# Patient Record
Sex: MALE | Race: WHITE | NOT HISPANIC OR LATINO | Employment: OTHER | URBAN - METROPOLITAN AREA
[De-identification: names, ages, dates, MRNs, and addresses within clinical notes are randomized per-mention and may not be internally consistent; named-entity substitution may affect disease eponyms.]

---

## 2019-05-01 LAB — HBA1C MFR BLD HPLC: 6.1 %

## 2019-05-30 ENCOUNTER — OFFICE VISIT (OUTPATIENT)
Dept: GASTROENTEROLOGY | Facility: CLINIC | Age: 84
End: 2019-05-30
Payer: COMMERCIAL

## 2019-05-30 VITALS
HEIGHT: 66 IN | DIASTOLIC BLOOD PRESSURE: 84 MMHG | HEART RATE: 84 BPM | BODY MASS INDEX: 27.64 KG/M2 | WEIGHT: 172 LBS | SYSTOLIC BLOOD PRESSURE: 138 MMHG

## 2019-05-30 DIAGNOSIS — Z79.01 CHRONIC ANTICOAGULATION: ICD-10-CM

## 2019-05-30 DIAGNOSIS — R09.89 THROAT CLEARING: Primary | ICD-10-CM

## 2019-05-30 DIAGNOSIS — K21.9 GASTROESOPHAGEAL REFLUX DISEASE, ESOPHAGITIS PRESENCE NOT SPECIFIED: ICD-10-CM

## 2019-05-30 DIAGNOSIS — Z12.11 COLON CANCER SCREENING: ICD-10-CM

## 2019-05-30 DIAGNOSIS — R05.9 COUGH: ICD-10-CM

## 2019-05-30 DIAGNOSIS — K22.70 BARRETT'S ESOPHAGUS WITHOUT DYSPLASIA: ICD-10-CM

## 2019-05-30 PROCEDURE — 99204 OFFICE O/P NEW MOD 45 MIN: CPT | Performed by: NURSE PRACTITIONER

## 2019-05-30 RX ORDER — METOPROLOL TARTRATE 50 MG/1
50 TABLET, FILM COATED ORAL EVERY 12 HOURS SCHEDULED
COMMUNITY

## 2019-05-30 RX ORDER — WARFARIN SODIUM 3 MG/1
3 TABLET ORAL
COMMUNITY
Start: 2019-04-17

## 2019-05-30 RX ORDER — ASPIRIN 81 MG/1
81 TABLET ORAL DAILY
COMMUNITY

## 2019-05-30 RX ORDER — MULTIVITAMIN
1 TABLET ORAL DAILY
COMMUNITY

## 2019-05-30 RX ORDER — OMEPRAZOLE 20 MG/1
20 CAPSULE, DELAYED RELEASE ORAL DAILY
Qty: 30 CAPSULE | Refills: 12 | Status: SHIPPED | OUTPATIENT
Start: 2019-05-30 | End: 2019-09-09 | Stop reason: DRUGHIGH

## 2019-05-30 RX ORDER — LISINOPRIL 20 MG/1
20 TABLET ORAL 2 TIMES DAILY
COMMUNITY

## 2019-05-30 RX ORDER — ATORVASTATIN CALCIUM 10 MG/1
5 TABLET, FILM COATED ORAL DAILY
COMMUNITY

## 2019-05-30 RX ORDER — TRIAMCINOLONE ACETONIDE 5 MG/G
CREAM TOPICAL DAILY
COMMUNITY

## 2019-06-03 ENCOUNTER — TELEPHONE (OUTPATIENT)
Dept: GASTROENTEROLOGY | Facility: CLINIC | Age: 84
End: 2019-06-03

## 2019-06-25 ENCOUNTER — TELEPHONE (OUTPATIENT)
Dept: GASTROENTEROLOGY | Facility: CLINIC | Age: 84
End: 2019-06-25

## 2019-09-09 ENCOUNTER — OFFICE VISIT (OUTPATIENT)
Dept: GASTROENTEROLOGY | Facility: CLINIC | Age: 84
End: 2019-09-09
Payer: COMMERCIAL

## 2019-09-09 VITALS
WEIGHT: 178 LBS | HEART RATE: 64 BPM | DIASTOLIC BLOOD PRESSURE: 72 MMHG | HEIGHT: 66 IN | BODY MASS INDEX: 28.61 KG/M2 | SYSTOLIC BLOOD PRESSURE: 132 MMHG

## 2019-09-09 DIAGNOSIS — K22.70 BARRETT'S ESOPHAGUS WITHOUT DYSPLASIA: ICD-10-CM

## 2019-09-09 DIAGNOSIS — Z12.11 COLON CANCER SCREENING: ICD-10-CM

## 2019-09-09 DIAGNOSIS — K21.9 GASTROESOPHAGEAL REFLUX DISEASE, ESOPHAGITIS PRESENCE NOT SPECIFIED: ICD-10-CM

## 2019-09-09 DIAGNOSIS — R05.9 COUGH: ICD-10-CM

## 2019-09-09 DIAGNOSIS — R09.89 THROAT CLEARING: Primary | ICD-10-CM

## 2019-09-09 DIAGNOSIS — Z79.01 CHRONIC ANTICOAGULATION: ICD-10-CM

## 2019-09-09 PROCEDURE — 99213 OFFICE O/P EST LOW 20 MIN: CPT | Performed by: NURSE PRACTITIONER

## 2019-09-09 RX ORDER — OMEPRAZOLE 40 MG/1
40 CAPSULE, DELAYED RELEASE ORAL DAILY
Qty: 30 CAPSULE | Refills: 2 | Status: SHIPPED | OUTPATIENT
Start: 2019-09-09 | End: 2019-11-29 | Stop reason: SDUPTHER

## 2019-09-09 NOTE — PATIENT INSTRUCTIONS
Continue to elevate the head of year bed at night  Eats smaller more frequent meals and avoid late-night eating (within 2-3 hours of bed)  Continue to minimize chocolate, caffeine, fatty/fried foods, alcohol, NSAIDs (ibuprofen/Advil/Motrin, Aleve/naproxen, full-dose aspirin)  Take the omeprazole in the morning 20-30 minutes before you eat or drink  You will have an upper scope here at our endoscopy center  We will contact Dr Moi Valentin office about stopping your warfarin before the procedure    Someone will be in touch with you with further instructions before the procedure

## 2019-09-11 ENCOUNTER — TELEPHONE (OUTPATIENT)
Dept: GASTROENTEROLOGY | Facility: CLINIC | Age: 84
End: 2019-09-11

## 2019-10-09 ENCOUNTER — TELEPHONE (OUTPATIENT)
Dept: GASTROENTEROLOGY | Facility: CLINIC | Age: 84
End: 2019-10-09

## 2019-10-09 NOTE — TELEPHONE ENCOUNTER
Called patient and left vm - positive for Akers's but no dyplasia - no recall advised secondary to age  Favorable biopsies -let pcp know

## 2019-11-29 DIAGNOSIS — R09.89 THROAT CLEARING: ICD-10-CM

## 2019-11-29 DIAGNOSIS — K21.9 GASTROESOPHAGEAL REFLUX DISEASE, ESOPHAGITIS PRESENCE NOT SPECIFIED: ICD-10-CM

## 2019-11-29 DIAGNOSIS — R05.9 COUGH: ICD-10-CM

## 2019-11-29 RX ORDER — OMEPRAZOLE 40 MG/1
CAPSULE, DELAYED RELEASE ORAL
Qty: 30 CAPSULE | Refills: 2 | Status: SHIPPED | OUTPATIENT
Start: 2019-11-29 | End: 2020-01-07 | Stop reason: SDUPTHER

## 2020-01-06 DIAGNOSIS — R09.89 THROAT CLEARING: ICD-10-CM

## 2020-01-06 DIAGNOSIS — R05.9 COUGH: ICD-10-CM

## 2020-01-06 DIAGNOSIS — K21.9 GASTROESOPHAGEAL REFLUX DISEASE, ESOPHAGITIS PRESENCE NOT SPECIFIED: ICD-10-CM

## 2020-01-06 NOTE — TELEPHONE ENCOUNTER
Pt left [facility] asking for  [phone]; asks if Rita Pietro is still here?/if so needs her to refill his medicine

## 2020-01-07 DIAGNOSIS — R09.89 THROAT CLEARING: ICD-10-CM

## 2020-01-07 DIAGNOSIS — R05.9 COUGH: ICD-10-CM

## 2020-01-07 DIAGNOSIS — K21.9 GASTROESOPHAGEAL REFLUX DISEASE, ESOPHAGITIS PRESENCE NOT SPECIFIED: ICD-10-CM

## 2020-01-07 RX ORDER — OMEPRAZOLE 40 MG/1
40 CAPSULE, DELAYED RELEASE ORAL DAILY
Qty: 30 CAPSULE | Refills: 2 | Status: SHIPPED | OUTPATIENT
Start: 2020-01-07 | End: 2021-01-25 | Stop reason: SDUPTHER

## 2020-01-07 RX ORDER — OMEPRAZOLE 40 MG/1
40 CAPSULE, DELAYED RELEASE ORAL DAILY
Qty: 30 CAPSULE | Refills: 2 | Status: SHIPPED | OUTPATIENT
Start: 2020-01-07 | End: 2020-05-26 | Stop reason: SDUPTHER

## 2020-01-07 NOTE — TELEPHONE ENCOUNTER
Pt walked in, asking for refill of omeprazole 40 mg 1 capsule daily sent to [facility]  Pt last o/v was 09/09/19 with [facility]   Pt cb [phone]     ce

## 2020-01-10 ENCOUNTER — TELEPHONE (OUTPATIENT)
Dept: GASTROENTEROLOGY | Facility: CLINIC | Age: 85
End: 2020-01-10

## 2020-01-10 NOTE — TELEPHONE ENCOUNTER
Pt call transf'd to spk w/ a nurse  Lengthy call/Pt very vague/hard of hearing  I think he had an EMG/needs carpal tunnel proc  Provided ph# for Dr Christia Mcburney

## 2020-05-26 DIAGNOSIS — R05.9 COUGH: ICD-10-CM

## 2020-05-26 DIAGNOSIS — K21.9 GASTROESOPHAGEAL REFLUX DISEASE, ESOPHAGITIS PRESENCE NOT SPECIFIED: ICD-10-CM

## 2020-05-26 DIAGNOSIS — R09.89 THROAT CLEARING: ICD-10-CM

## 2020-05-26 RX ORDER — OMEPRAZOLE 40 MG/1
40 CAPSULE, DELAYED RELEASE ORAL DAILY
Qty: 30 CAPSULE | Refills: 2 | Status: SHIPPED | OUTPATIENT
Start: 2020-05-26 | End: 2020-12-10

## 2020-12-10 DIAGNOSIS — R05.9 COUGH: ICD-10-CM

## 2020-12-10 DIAGNOSIS — K21.9 GASTROESOPHAGEAL REFLUX DISEASE: ICD-10-CM

## 2020-12-10 DIAGNOSIS — R09.89 THROAT CLEARING: ICD-10-CM

## 2020-12-10 RX ORDER — OMEPRAZOLE 40 MG/1
CAPSULE, DELAYED RELEASE ORAL
Qty: 30 CAPSULE | Refills: 2 | Status: SHIPPED | OUTPATIENT
Start: 2020-12-10 | End: 2021-01-25 | Stop reason: SDUPTHER

## 2021-01-25 ENCOUNTER — OFFICE VISIT (OUTPATIENT)
Dept: GASTROENTEROLOGY | Facility: CLINIC | Age: 86
End: 2021-01-25
Payer: COMMERCIAL

## 2021-01-25 VITALS
BODY MASS INDEX: 27.48 KG/M2 | HEIGHT: 66 IN | HEART RATE: 71 BPM | SYSTOLIC BLOOD PRESSURE: 120 MMHG | WEIGHT: 171 LBS | DIASTOLIC BLOOD PRESSURE: 72 MMHG

## 2021-01-25 DIAGNOSIS — Z79.01 CHRONIC ANTICOAGULATION: ICD-10-CM

## 2021-01-25 DIAGNOSIS — K21.9 GASTROESOPHAGEAL REFLUX DISEASE WITHOUT ESOPHAGITIS: Primary | ICD-10-CM

## 2021-01-25 DIAGNOSIS — K22.70 BARRETT'S ESOPHAGUS WITHOUT DYSPLASIA: ICD-10-CM

## 2021-01-25 PROCEDURE — 99214 OFFICE O/P EST MOD 30 MIN: CPT | Performed by: INTERNAL MEDICINE

## 2021-01-25 RX ORDER — OMEPRAZOLE 40 MG/1
40 CAPSULE, DELAYED RELEASE ORAL DAILY
Qty: 90 CAPSULE | Refills: 3 | Status: SHIPPED | OUTPATIENT
Start: 2021-01-25 | End: 2021-12-21

## 2021-01-25 NOTE — PROGRESS NOTES
[id number] - Outpatient Follow-up Note  Rosmery Serum 81 y o  male MRN: [id number]  Encounter: [id number]    ASSESSMENT AND PLAN:      1  Gastroesophageal reflux disease without esophagitis  29-year-old male with GERD and Akers's, here today for follow-up  Needs prescription refills  Daughter thinks that he had couple episodes in the past few years with dysphagia  He was dilated in 2017 with some improvement  At this point, they do not want to come off the Coumadin so they will continue to monitor for symptoms  If the dysphagia becomes more frequent, they will call to schedule EGD with dilation  - omeprazole (PriLOSEC) 40 MG capsule; Take 1 capsule (40 mg total) by mouth daily  Dispense: 90 capsule; Refill: 3    2  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  Last EGD in 2019 negative for dysplasia  No further recall given advanced age  - omeprazole (PriLOSEC) 40 MG capsule; Take 1 capsule (40 mg total) by mouth daily  Dispense: 90 capsule; Refill: 3    3  Chronic anticoagulation  On Coumadin for history of AFib      Followup Appointment:  1 year  ______________________________________________________________________    Chief Complaint   Patient presents with    Yearly follow up-meds     HPI:  29-year-old male here today for his annual follow-up with his daughter  The been doing well  Taking his omeprazole  Denies any nausea vomiting, heartburn, regurgitation  No abdominal pain  Appetite is good and moving his bowels regularly  Weight is stable  Patient does state that in the past several years, he was seen by our nurse practitioner for throat clearing and sensation of dysphagia  However, he was dilated in 2017 with some improvement  Since then, he has had may be 2 or 3 episodes  Does not recall any recent episodes      Historical Information   Past Medical History:   Diagnosis Date    Anemia     Atrial fibrillation and flutter ([address] )     follows with Annette Doan Alderfer    Akers esophagus     Chronic anticoagulation     CKD (chronic kidney disease) stage 3, GFR 30-59 ml/min     [facility]     GERD (gastroesophageal reflux disease)     Hyperlipidemia     Hypertension     Hyperthyroidism     JIM on CPAP     JIM treated with BiPAP      Past Surgical History:   Procedure Laterality Date    AV NODE ABLATION      for atrial fibrillation    CARPAL TUNNEL RELEASE Left     CATARACT EXTRACTION W/  INTRAOCULAR LENS IMPLANT Bilateral     COLONOSCOPY      EGD  02/16/2017    ROTATOR CUFF REPAIR Bilateral     TONSILLECTOMY      UPPER GASTROINTESTINAL ENDOSCOPY  02/16/2017    eerosive gastritis, irreg z line-(+) Akers's w/out dysplasia, (-) HP     Social History     Substance and Sexual Activity   Alcohol Use Yes    Frequency: Monthly or less    Drinks per session: 1 or 2    Binge frequency: Less than monthly    Comment: rare social     Social History     Substance and Sexual Activity   Drug Use Never     Social History     Tobacco Use   Smoking Status Former Smoker    Types: Cigarettes   Smokeless Tobacco Never Used     Family History   Problem Relation Age of Onset    No Known Problems Mother     No Known Problems Father     Colon cancer Neg Hx     Colon polyps Neg Hx          Current Outpatient Medications:     atorvastatin (LIPITOR) 10 mg tablet    lisinopril (ZESTRIL) 20 mg tablet    metoprolol tartrate (LOPRESSOR) 50 mg tablet    omeprazole (PriLOSEC) 40 MG capsule    warfarin (COUMADIN) 3 mg tablet    aspirin (ECOTRIN LOW STRENGTH) 81 mg EC tablet    Multiple Vitamin (MULTIVITAMIN) tablet    triamcinolone (KENALOG) 0 5 % cream  No Known Allergies  Reviewed medications and allergies and updated as indicated    PHYSICAL EXAM:    Blood pressure 120/72, pulse 71, height 5' 5 5" (1 664 m), weight 77 6 kg (171 lb)  Body mass index is 28 02 kg/m²    General Appearance: NAD, cooperative, alert  Eyes: Anicteric, PERRLA, EOMI  ENT:  Normocephalic, atraumatic, normal mucosa  Neck:  Supple, symmetrical, trachea midline  Resp:  Clear to auscultation bilaterally; no rales, rhonchi or wheezing; respirations unlabored   CV:  S1 S2, Regular rate and rhythm; no murmur, rub, or gallop  GI:  Soft, non-tender, non-distended; normal bowel sounds; no masses, no organomegaly   Rectal: Deferred  Musculoskeletal: No cyanosis, clubbing or edema  Normal ROM  Skin:  No jaundice, rashes, or lesions   Heme/Lymph: No palpable cervical lymphadenopathy  Psych: Normal affect, good eye contact  Neuro: No gross deficits, AAOx3    Lab Results:   Normal CBC and CMP in December 2020  Creatinine 1 24 with GFR of 53

## 2021-01-25 NOTE — LETTER
January 25, 2021     MD Steve [address] [place] [address]    Patient: Jose Shields   YOB: 1935   Date of Visit: 1/25/2021       Dear Dr Saud Barraza: Thank you for referring Jose Shields to me for evaluation  Below are my notes for this consultation  If you have questions, please do not hesitate to call me  I look forward to following your patient along with you  Sincerely,        Barbie Greene MD        CC: No Recipients  Barbie Greene MD  1/25/2021 12:48 PM  Sign when Signing Visit  [address] [facility] - Outpatient Follow-up Note  Jose Shields 81 y o  male MRN: [id number]  Encounter: [id number]    ASSESSMENT AND PLAN:      1  Gastroesophageal reflux disease without esophagitis  52-year-old male with GERD and Akers's, here today for follow-up  Needs prescription refills  Daughter thinks that he had couple episodes in the past few years with dysphagia  He was dilated in 2017 with some improvement  At this point, they do not want to come off the Coumadin so they will continue to monitor for symptoms  If the dysphagia becomes more frequent, they will call to schedule EGD with dilation  - omeprazole (PriLOSEC) 40 MG capsule; Take 1 capsule (40 mg total) by mouth daily  Dispense: 90 capsule; Refill: 3    2  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  Last EGD in 2019 negative for dysplasia  No further recall given advanced age  - omeprazole (PriLOSEC) 40 MG capsule; Take 1 capsule (40 mg total) by mouth daily  Dispense: 90 capsule; Refill: 3    3  Chronic anticoagulation  On Coumadin for history of AFib      Followup Appointment:  1 year  ______________________________________________________________________    Chief Complaint   Patient presents with    Yearly follow up-meds     HPI:  52-year-old male here today for his annual follow-up with his daughter  The been doing well  Taking his omeprazole    Denies any nausea vomiting, heartburn, regurgitation  No abdominal pain  Appetite is good and moving his bowels regularly  Weight is stable  Patient does state that in the past several years, he was seen by our nurse practitioner for throat clearing and sensation of dysphagia  However, he was dilated in 2017 with some improvement  Since then, he has had may be 2 or 3 episodes  Does not recall any recent episodes      Historical Information   Past Medical History:   Diagnosis Date    Anemia     [facility] )     follows with Jaime Mukherjee    Akers esophagus     Chronic anticoagulation     CKD (chronic kidney disease) stage 3, GFR 30-59 ml/min     [facility]     GERD (gastroesophageal reflux disease)     Hyperlipidemia     Hypertension     Hyperthyroidism     JIM on CPAP     JIM treated with BiPAP      Past Surgical History:   Procedure Laterality Date    AV NODE ABLATION      for atrial fibrillation    CARPAL TUNNEL RELEASE Left     CATARACT EXTRACTION W/  INTRAOCULAR LENS IMPLANT Bilateral     COLONOSCOPY      EGD  02/16/2017    ROTATOR CUFF REPAIR Bilateral     TONSILLECTOMY      UPPER GASTROINTESTINAL ENDOSCOPY  02/16/2017    eerosive gastritis, irreg z line-(+) Akers's w/out dysplasia, (-) HP     Social History     Substance and Sexual Activity   Alcohol Use Yes    Frequency: Monthly or less    Drinks per session: 1 or 2    Binge frequency: Less than monthly    Comment: rare social     Social History     Substance and Sexual Activity   Drug Use Never     Social History     Tobacco Use   Smoking Status Former Smoker    Types: Cigarettes   Smokeless Tobacco Never Used     Family History   Problem Relation Age of Onset    No Known Problems Mother     No Known Problems Father     Colon cancer Neg Hx     Colon polyps Neg Hx          Current Outpatient Medications:     atorvastatin (LIPITOR) 10 mg tablet    lisinopril (ZESTRIL) 20 mg tablet    metoprolol tartrate (LOPRESSOR) 50 mg tablet    omeprazole (PriLOSEC) 40 MG capsule    warfarin (COUMADIN) 3 mg tablet    aspirin (ECOTRIN LOW STRENGTH) 81 mg EC tablet    Multiple Vitamin (MULTIVITAMIN) tablet    triamcinolone (KENALOG) 0 5 % cream  No Known Allergies  Reviewed medications and allergies and updated as indicated    PHYSICAL EXAM:    Blood pressure 120/72, pulse 71, height 5' 5 5" (1 664 m), weight 77 6 kg (171 lb)  Body mass index is 28 02 kg/m²  General Appearance: NAD, cooperative, alert  Eyes: Anicteric, PERRLA, EOMI  ENT:  Normocephalic, atraumatic, normal mucosa  Neck:  Supple, symmetrical, trachea midline  Resp:  Clear to auscultation bilaterally; no rales, rhonchi or wheezing; respirations unlabored   CV:  S1 S2, Regular rate and rhythm; no murmur, rub, or gallop  GI:  Soft, non-tender, non-distended; normal bowel sounds; no masses, no organomegaly   Rectal: Deferred  Musculoskeletal: No cyanosis, clubbing or edema  Normal ROM  Skin:  No jaundice, rashes, or lesions   Heme/Lymph: No palpable cervical lymphadenopathy  Psych: Normal affect, good eye contact  Neuro: No gross deficits, AAOx3    Lab Results:   Normal CBC and CMP in December 2020  Creatinine 1 24 with GFR of 53

## 2021-12-20 DIAGNOSIS — K22.70 BARRETT'S ESOPHAGUS WITHOUT DYSPLASIA: ICD-10-CM

## 2021-12-20 DIAGNOSIS — K21.9 GASTROESOPHAGEAL REFLUX DISEASE WITHOUT ESOPHAGITIS: ICD-10-CM

## 2021-12-21 RX ORDER — OMEPRAZOLE 40 MG/1
CAPSULE, DELAYED RELEASE ORAL
Qty: 30 CAPSULE | Refills: 3 | Status: SHIPPED | OUTPATIENT
Start: 2021-12-21 | End: 2022-04-11 | Stop reason: SDUPTHER

## 2022-01-18 ENCOUNTER — TELEPHONE (OUTPATIENT)
Dept: GASTROENTEROLOGY | Facility: CLINIC | Age: 87
End: 2022-01-18

## 2022-01-18 NOTE — TELEPHONE ENCOUNTER
Pt states he needs a refill of Omeprazole 40 mg/takes once daily to [facility]; used to get 1-mo supply but would like 3-mo supply if possible  Also, asks to confirm upcoming appt on the 25th Transf'd call to  re: appt because it is listed as cancelled  If Spencer Thakkar # [id number]  Called Pt-advised that Rx was sent to [facility] on 12/21  He will ck w/ pharmacy  Pt states he spoke w/ Rec about appt - his card was from last yr/he does not have an upcoming appt; did not sched/was advised by Rec to call if he had any problems

## 2022-04-11 ENCOUNTER — OFFICE VISIT (OUTPATIENT)
Dept: GASTROENTEROLOGY | Facility: CLINIC | Age: 87
End: 2022-04-11
Payer: COMMERCIAL

## 2022-04-11 VITALS
DIASTOLIC BLOOD PRESSURE: 68 MMHG | WEIGHT: 169.8 LBS | SYSTOLIC BLOOD PRESSURE: 110 MMHG | BODY MASS INDEX: 27.29 KG/M2 | HEIGHT: 66 IN

## 2022-04-11 DIAGNOSIS — K21.9 GASTROESOPHAGEAL REFLUX DISEASE WITHOUT ESOPHAGITIS: ICD-10-CM

## 2022-04-11 DIAGNOSIS — K22.70 BARRETT'S ESOPHAGUS WITHOUT DYSPLASIA: ICD-10-CM

## 2022-04-11 PROCEDURE — 99214 OFFICE O/P EST MOD 30 MIN: CPT | Performed by: INTERNAL MEDICINE

## 2022-04-11 RX ORDER — AMLODIPINE BESYLATE 2.5 MG/1
2.5 TABLET ORAL DAILY
COMMUNITY

## 2022-04-11 RX ORDER — OMEPRAZOLE 40 MG/1
40 CAPSULE, DELAYED RELEASE ORAL DAILY
Qty: 90 CAPSULE | Refills: 12 | Status: SHIPPED | OUTPATIENT
Start: 2022-04-11 | End: 2022-04-29 | Stop reason: SDUPTHER

## 2022-04-11 RX ORDER — AMLODIPINE BESYLATE AND ATORVASTATIN CALCIUM 10; 10 MG/1; MG/1
1 TABLET, FILM COATED ORAL DAILY
COMMUNITY

## 2022-04-11 RX ORDER — FUROSEMIDE 20 MG/1
20 TABLET ORAL DAILY
COMMUNITY
Start: 2022-02-14

## 2022-04-11 NOTE — PATIENT INSTRUCTIONS
Increase omeprazole 40 mg twice a day for 2 weeks and assess symptoms  If feels better on the higher dose of acid blocker than continue on this  If no better after 2 weeks decreasing omeprazole back to 40 mg once a day  Let me know what you are doing  Follow-up office visit 4 months  At that time can decide whether we stop anticoagulation but she threw her a repeat EGD

## 2022-04-11 NOTE — PROGRESS NOTES
[address] [facility] - Outpatient Follow-up Note  Tess Vasquez 81 y o  male MRN: [id number]  Encounter: [id number]    ASSESSMENT AND PLAN:      1  Gastroesophageal reflux disease without esophagitis  Patient with known chronic GERD and Akers's  On omeprazole 40 mg daily  Unclear whether this chronic throat clearing atypical reflux symptoms or ENT etiology  - will increase omeprazole to twice a day for 2 weeks  His daughter will call me and give me an update  If atypical symptoms improved with increased acid blockers then will continue the omeprazole twice a day  If there is no change in symptoms then will decrease back down to once a day  - ideally would like to hold off repeating EGD especially in light of his age, requirement to discontinue anticoagulation, and recent bouts with congestive heart failure  - omeprazole (PriLOSEC) 40 MG capsule; Take 1 capsule (40 mg total) by mouth daily  Dispense: 90 capsule; Refill: 12    2  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  Last EGD September 2019  No plans to repeat secondary to age      Followup Appointment:  4 MONTHS  ______________________________________________________________________    Chief Complaint   Patient presents with    Cough     with alot of mucous     HPI:  The patient is seen back in the office today secondary to possible increased GERD  He has a history of GERD and Akers's esophagus  He had a dilation done in 2017 for some dysphagia and regurgitation  His last EGD was September 2019 which revealed Akers's without evidence of dysplasia  He was doing well on omeprazole 40 mg daily  He reports he is having increasing issues with chronically clearing his throat  He denies any nausea or vomiting  He denies any dysphagia, odynophagia, early satiety  He denies any chest or abdominal pain  He is unclear whether these symptoms are related to eating    He came back today wondering whether a repeat EGD with dilation would help    He has had recent weight loss related to being treated with congestive heart failure with diuretics by Dr Lulu Salgado   Past Medical History:   Diagnosis Date    Anemia     Atrial fibrillation [facility] flutter [facility] )     follows with Zaida Hemp    Akers esophagus     Chronic anticoagulation     CKD (chronic kidney disease) stage 3, GFR 30-59 ml/min (HCC)     [facility]A (cere[facility]) [facility]     GERD (gastroesophageal reflux disease)     Hyperlipidemia     Hypertension     Hyperthyroidism     JIM on CPAP     JIM treated with BiPAP      Past Surgical History:   Procedure Laterality Date    AV NODE ABLATION      for atrial fibrillation    CARPAL TUNNEL RELEASE Left     CATARACT EXTRACTION W/  INTRAOCULAR LENS IMPLANT Bilateral     COLONOSCOPY      EGD  02/16/2017    ROTATOR CUFF REPAIR Bilateral     TONSILLECTOMY      UPPER GASTROINTESTINAL ENDOSCOPY  02/16/2017    eerosive gastritis, irreg z line-(+) Akers's w/out dysplasia, (-) HP     Social History     Substance and Sexual Activity   Alcohol Use Yes    Comment: rare social     Social History     Substance and Sexual Activity   Drug Use Never     Social History     Tobacco Use   Smoking Status Former Smoker    Types: Cigarettes   Smokeless Tobacco Never Used     Family History   Problem Relation Age of Onset    No Known Problems Mother     No Known Problems Father     Colon cancer Neg Hx     Colon polyps Neg Hx          Current Outpatient Medications:     amLODIPine (Norvasc) 2 5 mg tablet    amLODIPine-atorvastatin (CADUET) 10-10 MG per tablet    atorvastatin (LIPITOR) 10 mg tablet    furosemide (LASIX) 20 mg tablet    lisinopril (ZESTRIL) 20 mg tablet    metoprolol tartrate (LOPRESSOR) 50 mg tablet    Multiple Vitamin (MULTIVITAMIN) tablet    omeprazole (PriLOSEC) 40 MG capsule    warfarin (COUMADIN) 3 mg tablet    aspirin (ECOTRIN LOW STRENGTH) 81 mg EC tablet    triamcinolone (KENALOG) 0 5 % cream  No Known Allergies  Reviewed medications and allergies and updated as indicated    PHYSICAL EXAM:    Blood pressure 110/68, height 5' 5 5" (1 664 m), weight 77 kg (169 lb 12 8 oz)  Body mass index is 27 83 kg/m²  General Appearance: NAD, cooperative, alert  Eyes: Anicteric, PERRLA, EOMI  ENT:  Normocephalic, atraumatic, normal mucosa  Neck:  Supple, symmetrical, trachea midline  Resp:  Clear to auscultation bilaterally; no rales, rhonchi or wheezing; respirations unlabored   CV:   irregular S1 S2; no murmur, rub, or gallop  GI:  Soft, non-tender, non-distended; normal bowel sounds; no masses, no organomegaly   Rectal: Deferred  Musculoskeletal: No cyanosis, clubbing or edema  Normal ROM  Skin:  No jaundice, rashes, or lesions   Heme/Lymph: No palpable cervical lymphadenopathy  Psych: Normal affect, good eye contact  Neuro: No gross deficits, AAOx3    Lab Results:   None recently     Radiology Results:   No results found

## 2022-04-11 NOTE — LETTER
April 11, 2022     MD Steve Jewell  [address] [place] [facility] [address]    Patient: Rosmery Cleary   YOB: 1935   Date of Visit: 4/11/2022       Dear Dr Beba Kumar: Thank you for referring Rosmery Cleary to me for evaluation  Below are my notes for this consultation  If you have questions, please do not hesitate to call me  I look forward to following your patient along with you  Sincerely,        Thierry Cervantes MD        CC: MD Thierry Roche MD  4/11/2022  8:41 AM  Sign when Signing Visit  [address] [facility] - Outpatient Follow-up Note  Rosmery Cleary 81 y o  male MRN: [id number]  Encounter: [id number]    ASSESSMENT AND PLAN:      1  Gastroesophageal reflux disease without esophagitis  Patient with known chronic GERD and Akers's  On omeprazole 40 mg daily  Unclear whether this chronic throat clearing atypical reflux symptoms or ENT etiology  - will increase omeprazole to twice a day for 2 weeks  His daughter will call me and give me an update  If atypical symptoms improved with increased acid blockers then will continue the omeprazole twice a day  If there is no change in symptoms then will decrease back down to once a day  - ideally would like to hold off repeating EGD especially in light of his age, requirement to discontinue anticoagulation, and recent bouts with congestive heart failure  - omeprazole (PriLOSEC) 40 MG capsule; Take 1 capsule (40 mg total) by mouth daily  Dispense: 90 capsule; Refill: 12    2  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  Last EGD September 2019  No plans to repeat secondary to age      Followup Appointment:  4 MONTHS  ______________________________________________________________________    Chief Complaint   Patient presents with    Cough     with alot of mucous     HPI:  The patient is seen back in the office today secondary to possible increased GERD    He has a history of GERD and Akers's esophagus  He had a dilation done in 2017 for some dysphagia and regurgitation  His last EGD was September 2019 which revealed Akers's without evidence of dysplasia  He was doing well on omeprazole 40 mg daily  He reports he is having increasing issues with chronically clearing his throat  He denies any nausea or vomiting  He denies any dysphagia, odynophagia, early satiety  He denies any chest or abdominal pain  He is unclear whether these symptoms are related to eating  He came back today wondering whether a repeat EGD with dilation would help    He has had recent weight loss related to being treated with congestive heart failure with diuretics by Dr Nixon Ortega   Past Medical History:   Diagnosis Date    Anemia     Atrial fibrillation and flutter ([facility] )     follows with Finis Feeling    Akers esophagus     Chronic anticoagulation     CKD (chronic kidney disease) stage 3, GFR 30-59 ml/min ([facility])     CVA (cerebral vascular accident) ([facility] )     GERD (gastroesophageal reflux disease)     Hyperlipidemia     Hypertension     Hyperthyroidism     JIM on CPAP     JIM treated with BiPAP      Past Surgical History:   Procedure Laterality Date    AV NODE ABLATION      for atrial fibrillation    CARPAL TUNNEL RELEASE Left     CATARACT EXTRACTION W/  INTRAOCULAR LENS IMPLANT Bilateral     COLONOSCOPY      EGD  02/16/2017    ROTATOR CUFF REPAIR Bilateral     TONSILLECTOMY      UPPER GASTROINTESTINAL ENDOSCOPY  02/16/2017    eerosive gastritis, irreg z line-(+) Akers's w/out dysplasia, (-) HP     Social History     Substance and Sexual Activity   Alcohol Use Yes    Comment: rare social     Social History     Substance and Sexual Activity   Drug Use Never     Social History     Tobacco Use   Smoking Status Former Smoker    Types: Cigarettes   Smokeless Tobacco Never Used     Family History   Problem Relation Age of Onset    No Known Problems Mother     No Known Problems Father     Colon cancer Neg Hx     Colon polyps Neg Hx          Current Outpatient Medications:     amLODIPine (Norvasc) 2 5 mg tablet    amLODIPine-atorvastatin (CADUET) 10-10 MG per tablet    atorvastatin (LIPITOR) 10 mg tablet    furosemide (LASIX) 20 mg tablet    lisinopril (ZESTRIL) 20 mg tablet    metoprolol tartrate (LOPRESSOR) 50 mg tablet    Multiple Vitamin (MULTIVITAMIN) tablet    omeprazole (PriLOSEC) 40 MG capsule    warfarin (COUMADIN) 3 mg tablet    aspirin (ECOTRIN LOW STRENGTH) 81 mg EC tablet    triamcinolone (KENALOG) 0 5 % cream  No Known Allergies  Reviewed medications and allergies and updated as indicated    PHYSICAL EXAM:    Blood pressure 110/68, height 5' 5 5" (1 664 m), weight 77 kg (169 lb 12 8 oz)  Body mass index is 27 83 kg/m²  General Appearance: NAD, cooperative, alert  Eyes: Anicteric, PERRLA, EOMI  ENT:  Normocephalic, atraumatic, normal mucosa  Neck:  Supple, symmetrical, trachea midline  Resp:  Clear to auscultation bilaterally; no rales, rhonchi or wheezing; respirations unlabored   CV:   irregular S1 S2; no murmur, rub, or gallop  GI:  Soft, non-tender, non-distended; normal bowel sounds; no masses, no organomegaly   Rectal: Deferred  Musculoskeletal: No cyanosis, clubbing or edema  Normal ROM  Skin:  No jaundice, rashes, or lesions   Heme/Lymph: No palpable cervical lymphadenopathy  Psych: Normal affect, good eye contact  Neuro: No gross deficits, AAOx3    Lab Results:   None recently     Radiology Results:   No results found

## 2022-04-28 DIAGNOSIS — K21.9 GASTROESOPHAGEAL REFLUX DISEASE WITHOUT ESOPHAGITIS: ICD-10-CM

## 2022-04-28 DIAGNOSIS — K22.70 BARRETT'S ESOPHAGUS WITHOUT DYSPLASIA: ICD-10-CM

## 2022-04-28 RX ORDER — OMEPRAZOLE 40 MG/1
40 CAPSULE, DELAYED RELEASE ORAL DAILY
Qty: 180 CAPSULE | Refills: 1 | Status: CANCELLED | OUTPATIENT
Start: 2022-04-28

## 2022-04-28 NOTE — TELEPHONE ENCOUNTER
To [facility] -  Please do not fill, just send message back to me please  What I entered is incorrect  Removed the prescription at this time

## 2022-04-28 NOTE — TELEPHONE ENCOUNTER
Pt walked in; req'd refill of Omeprazole to [facility]; stated Rx was changed to twice daily when he was seen  If Pierce Goldberg cell# [phone]  After noting above retrieved a VM mssg from Pt stating he needs a new script sent to his pharmacy because med was upped from one to two and he will be out in 2 days  CB# [phone]  Rx was sent after OV but for one daily

## 2022-04-29 RX ORDER — OMEPRAZOLE 40 MG/1
40 CAPSULE, DELAYED RELEASE ORAL 2 TIMES DAILY
Qty: 180 CAPSULE | Refills: 3 | Status: SHIPPED | OUTPATIENT
Start: 2022-04-29

## 2022-04-29 NOTE — TELEPHONE ENCOUNTER
Aravind Jones called back and clarified that her father was taking the omeprazole 40 mg dose and he is improved on the BID dosing  Rx sent to pharmacy, previous daily dosing d/c'd  Mr Josh Del Cid called and left message on billing line regarding his Rx refill  I called patient back on home phone (cell not set up to leave [facility]) that refill was sent to [facility]

## 2022-05-19 NOTE — PROGRESS NOTES
[facility] - Outpatient Follow-up Note  Author Nicho 81 y o  male MRN: [id number]  Encounter: [id number]    ASSESSMENT AND PLAN:      1  Throat clearing  2  Cough  Likely from a symptomatic reflux which exacerbated after patient self discontinued his omeprazole 6-12 months prior to his previous office visit May  Still with a lot of morning throat clearing on omeprazole 20 mg  Differential considerations include PUD, gastritis, esophagitis, worsening Akers's  Increase omeprazole to 40 mg dose  Reschedule EGD-BM EC which was previously canceled during his wife's illness and subsequent passing     - omeprazole (PriLOSEC) 40 MG capsule; Take 1 capsule (40 mg total) by mouth daily  Dispense: 30 capsule; Refill: 2    3  Gastroesophageal reflux disease, esophagitis presence not specified  EGD in 2017 showed erosive gastritis  At that time he was prescribed omeprazole 20 mg but had previously stopped it  Increase PPI to 40 mg dose due to breakthrough with 20 mg dose  Reviewed lifestyle modifications  Decreased his coffee/caffeine consumption and does elevate his head at night with blocks  - omeprazole (PriLOSEC) 40 MG capsule; Take 1 capsule (40 mg total) by mouth daily  Dispense: 30 capsule; Refill: 2    4  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  Confirmed per EGD in 2017  Reinforced importance of continuing omeprazole as part of his Akers's treatment  5  Chronic anticoagulation  On warfarin for atrial fibrillation/flutter  Reviewed with patient and daughter that there is a small thromboembolic risk while holding warfarin james procedure  The patient and daughter understand the risks and benefits of holding the medication and agree to proceed pending approval from the prescribing provider, Dr Janet Vasquez  6  Colon cancer screening  Last colonoscopy at [facility] 2016 with 5 year recall        Followup Appointment:  4 weeks______________________________________________________________________    Chief Complaint   Patient presents with    Follow up     Throat clearing; EGD due     HPI:  62-year-old male patient returns to the office, accompanied by his daughter to follow up his throat clearing which increased over the year prior to his last office visit in May  The patient did not F/U with his EGD as his wife became ill and subsequently passed away so he canceled the procedure  Continues with throat clearing as well as increased coughing that is worst in the morning  There is occasional liquid reflux noted predominantly after drinking his morning coffee  His throat clearing is improved through the early part of the day after taking his 20 mg omeprazole dose  Has decreased his coffee to 2 cups per day but no other caffeine  No NSAIDs  Denies nausea/vomiting, fever/chills, dysphagia, odynophagia, chest pain, heartburn, abdominal pain, early satiety, fatigue  No change in bowel habits, melena or hematochezia  Appetite is normal   Weight is stable      Historical Information   Past Medical History:   Diagnosis Date    Atrial fibrillation and flutter ([address] )     follows with Joy Magana    Akers esophagus     Chronic anticoagulation     GERD (gastroesophageal reflux disease)     Hyperlipidemia     Hypertension     JIM on CPAP      Past Surgical History:   Procedure Laterality Date    AV NODE ABLATION      for atrial fibrillation    CARPAL TUNNEL RELEASE Left     CATARACT EXTRACTION W/  INTRAOCULAR LENS IMPLANT Bilateral     COLONOSCOPY      EGD  02/16/2017    ROTATOR CUFF REPAIR Bilateral     TONSILLECTOMY      UPPER GASTROINTESTINAL ENDOSCOPY  02/16/2017    eerosive gastritis, irreg z line-(+) Akers's w/out dysplasia, (-) HP     Social History     Substance and Sexual Activity   Alcohol Use Yes    Frequency: Monthly or less    Drinks per session: 1 or 2    Binge frequency: Less than monthly    Comment: rare social     Social History     Substance and Sexual Activity   Drug Use Never     Social History     Tobacco Use   Smoking Status Former Smoker    Types: Cigarettes   Smokeless Tobacco Never Used     Family History   Problem Relation Age of Onset    No Known Problems Mother     No Known Problems Father     Colon cancer Neg Hx     Colon polyps Neg Hx          Current Outpatient Medications:     aspirin (ECOTRIN LOW STRENGTH) 81 mg EC tablet    atorvastatin (LIPITOR) 10 mg tablet    lisinopril (ZESTRIL) 20 mg tablet    metoprolol tartrate (LOPRESSOR) 50 mg tablet    Multiple Vitamin (MULTIVITAMIN) tablet    triamcinolone (KENALOG) 0 5 % cream    warfarin (COUMADIN) 3 mg tablet    omeprazole (PriLOSEC) 40 MG capsule  No Known Allergies    10 Point REVIEW OF SYSTEMS IS positive for throat clearing, coughing, reflux  All other systems are negative  PHYSICAL EXAM:    Blood pressure 132/72, pulse 64, height 5' 5 5" (1 664 m), weight 80 7 kg (178 lb)  Body mass index is 29 17 kg/m²  General Appearance: NAD, cooperative, alert  Head:   Normocephalic, atraumatic  Eyes: Anicteric, PERRLA, EOMI  ENT:  Normal external appearance, normal mucosa  Neck:  Supple, symmetrical, trachea midline  Resp:  Clear to auscultation bilaterally; no rales, rhonchi or wheezing; respirations unlabored   CV:  S1 S2, Regular rate and rhythm; no murmur, rub, or gallop  GI:  Soft, non-tender, non-distended; normal bowel sounds; no masses, no organomegaly   Rectal: Deferred  :  Deferred   Musculoskeletal: No cyanosis, clubbing or edema  Normal ROM  Skin:  No jaundice, rashes, or lesions   Heme/Lymph: No palpable cervical lymphadenopathy  Psych: Normal affect, good eye contact  Neuro: No gross deficits, AAOx3    Lab Results:  Labs from May, 2019 were reviewed      No results found for: WBC, HGB, HCT, MCV, PLT  No results found for: NA, K, CL, CO2, ANIONGAP, BUN, CREATININE, GLUCOSE, GLUF, CALCIUM, CORRECTEDCA, AST, ALT, ALKPHOS, PROT, BILITOT, EGFR  No results found for: IRON, TIBC, FERRITIN  No results found for: LIPASE    Radiology Results:   No results found  stated

## 2022-08-15 ENCOUNTER — OFFICE VISIT (OUTPATIENT)
Dept: GASTROENTEROLOGY | Facility: CLINIC | Age: 87
End: 2022-08-15
Payer: COMMERCIAL

## 2022-08-15 VITALS
SYSTOLIC BLOOD PRESSURE: 124 MMHG | HEART RATE: 65 BPM | WEIGHT: 163 LBS | HEIGHT: 65 IN | DIASTOLIC BLOOD PRESSURE: 70 MMHG | BODY MASS INDEX: 27.16 KG/M2

## 2022-08-15 DIAGNOSIS — K22.70 BARRETT'S ESOPHAGUS WITHOUT DYSPLASIA: ICD-10-CM

## 2022-08-15 DIAGNOSIS — K21.9 GASTROESOPHAGEAL REFLUX DISEASE WITHOUT ESOPHAGITIS: Primary | ICD-10-CM

## 2022-08-15 PROCEDURE — 99214 OFFICE O/P EST MOD 30 MIN: CPT | Performed by: INTERNAL MEDICINE

## 2022-08-15 NOTE — PROGRESS NOTES
[id number] - Outpatient Follow-up Note  Lynn Herring 81 y o  male MRN: [id number]  Encounter: [id number]    ASSESSMENT AND PLAN:      1  Gastroesophageal reflux disease without esophagitis  Unclear if ongoing throat clearing related to reflux or ENT issue  Initially thought increasing PPI to twice a day improve things but upon further questioning maybe not  Back down to once a day dosing which he thinks makes no difference but his daughter thinks that he is not clearing his throat as often  She also thinks it is related to the excessive amount of hot sauce that he eats  - continue omeprazole 40 mg daily  - reviewed GERD diet in particularly with him eliminating the hot sauce  - use antacids as needed to see if this resolves throat clearing during these episodes  - may need ENT evaluation    2  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  Last EGD September 2019  Would like not to repeat EGD      Followup Appointment:  4 months  ______________________________________________________________________    Chief Complaint   Patient presents with    Follow-up     4 months     HPI:  The patient is seen back in the office today with his daughter  He has a known history of GERD and Akers's esophagus and has been on omeprazole 40 mg daily  We was evaluated 4 months ago he was having increasing issues with throat clearing particularly after eating  Had a long discussion with the patient and his daughter and they did not want a put him through any aggressive evaluation in light of his age in anticoagulation  We increased the omeprazole to 40 mg twice a day and told the daughter to give us an update in about a month  She called and reported that he was doing well on the higher dose so we decided to keep him on that  Over the next several months it became less clear whether his symptoms were improved on it twice a day    He did not think it was really helping although the daughter thought it was  Several weeks ago he decrease his omeprazole back down to once a day  He reports that has been no different however again the daughter thinks the throat clearing is worse now that he is on omeprazole once a day  The symptoms usually occur after eating  His daughter believes that he eats will wait too much hot sauce which is contributing to his symptoms  There is no dysphagia, odynophagia, early satiety  Denies any chest or abdominal pain  His bowels are normal   His weight is stable      Historical Information   Past Medical History:   Diagnosis Date    Anemia     [facility]     [facility]    Akers esophagus     Chronic anticoagulation     CKD (chronic kidney disease) stage 3, GFR 30-59 ml/min ([facility]     [facility]     GERD (gastroesophageal reflux disease)     Hyperlipidemia     Hypertension     Hyperthyroidism     JIM on CPAP     JIM treated with BiPAP      Past Surgical History:   Procedure Laterality Date    AV NODE ABLATION      for atrial fibrillation    CARPAL TUNNEL RELEASE Left     CATARACT EXTRACTION W/  INTRAOCULAR LENS IMPLANT Bilateral     COLONOSCOPY      EGD  02/16/2017    ROTATOR CUFF REPAIR Bilateral     TONSILLECTOMY      UPPER GASTROINTESTINAL ENDOSCOPY  02/16/2017    eerosive gastritis, irreg z line-(+) Akers's w/out dysplasia, (-) HP     Social History     Substance and Sexual Activity   Alcohol Use Yes    Comment: rare social     Social History     Substance and Sexual Activity   Drug Use Never     Social History     Tobacco Use   Smoking Status Former Smoker    Types: Cigarettes   Smokeless Tobacco Never Used     Family History   Problem Relation Age of Onset    No Known Problems Mother     No Known Problems Father     Colon cancer Neg Hx     Colon polyps Neg Hx          Current Outpatient Medications:     amLODIPine (NORVASC) 2 5 mg tablet    amLODIPine-atorvastatin (CADUET) 10-10 MG per tablet    atorvastatin (LIPITOR) 10 mg tablet    furosemide (LASIX) 20 mg tablet    lisinopril (ZESTRIL) 20 mg tablet    metoprolol tartrate (LOPRESSOR) 50 mg tablet    Multiple Vitamin (MULTIVITAMIN) tablet    omeprazole (PriLOSEC) 40 MG capsule    triamcinolone (KENALOG) 0 5 % cream    warfarin (COUMADIN) 3 mg tablet    aspirin (ECOTRIN LOW STRENGTH) 81 mg EC tablet  Allergies   Allergen Reactions    Diclofenac Rash     Full body rash     Reviewed medications and allergies and updated as indicated    PHYSICAL EXAM:    Blood pressure 124/70, pulse 65, height 5' 5" (1 651 m), weight 73 9 kg (163 lb)  Body mass index is 27 12 kg/m²  General Appearance: NAD, cooperative, alert  Eyes: Anicteric, PERRLA, EOMI  ENT:  Normocephalic, atraumatic, normal mucosa  Neck:  Supple, symmetrical, trachea midline  Resp:  Clear to auscultation bilaterally; no rales, rhonchi or wheezing; respirations unlabored   CV:  S1 S2, Regular rate and rhythm; no murmur, rub, or gallop  GI:  Soft, non-tender, non-distended; normal bowel sounds; no masses, no organomegaly   Rectal: Deferred  Musculoskeletal: No cyanosis, clubbing or edema  Normal ROM  Skin:  No jaundice, rashes, or lesions   Heme/Lymph: No palpable cervical lymphadenopathy  Psych: Normal affect, good eye contact  Neuro: No gross deficits, AAOx3    Lab Results:   Hemoglobin 11 3, hematocrit 36 2, white count 5 9, platelet count 653   Sodium 145, potassium 5 4, chloride 111, bicarb 24, BUN 27, creatinine 1 25, glucose 98   LFTs normal  (7/6/22)    Radiology Results:   No results found

## 2022-08-15 NOTE — PATIENT INSTRUCTIONS
Continue omeprazole just once a day  Avoid spicy foods and hot sauce for 1-2 weeks to see if this changes symptoms  Use antacids as needed for the throat clearing to see if this improves symptoms  Follow-up office visit 4 months    If no improvement may need ENT evaluation

## 2022-08-15 NOTE — LETTER
August 15, 2022     MD Steve Vital  [address] [place] [address]    Patient: Ella Renner   YOB: 1935   Date of Visit: 8/15/2022       Dear Dr Jeremie Maki: Thank you for referring Ella Renner to me for evaluation  Below are my notes for this consultation  If you have questions, please do not hesitate to call me  I look forward to following your patient along with you  Sincerely,        Bernette Barman Lynetta Hamman, MD        CC: Riesa Kidd, MD Bernette Barman Lynetta Hamman, MD  8/15/2022  4:11 PM  Sign when Signing Visit  [id number] [facility] - Outpatient Follow-up Note  Ella Renner 81 y o  male MRN: [id number]  Encounter: [id number]    ASSESSMENT AND PLAN:      1  Gastroesophageal reflux disease without esophagitis  Unclear if ongoing throat clearing related to reflux or ENT issue  Initially thought increasing PPI to twice a day improve things but upon further questioning maybe not  Back down to once a day dosing which he thinks makes no difference but his daughter thinks that he is not clearing his throat as often  She also thinks it is related to the excessive amount of hot sauce that he eats  - continue omeprazole 40 mg daily  - reviewed GERD diet in particularly with him eliminating the hot sauce  - use antacids as needed to see if this resolves throat clearing during these episodes  - may need ENT evaluation    2  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  Last EGD September 2019  Would like not to repeat EGD      Followup Appointment:  4 months  ______________________________________________________________________    Chief Complaint   Patient presents with    Follow-up     4 months     HPI:  The patient is seen back in the office today with his daughter  He has a known history of GERD and Akers's esophagus and has been on omeprazole 40 mg daily    We was evaluated 4 months ago he was having increasing issues with throat clearing particularly after eating  Had a long discussion with the patient and his daughter and they did not want a put him through any aggressive evaluation in light of his age in anticoagulation  We increased the omeprazole to 40 mg twice a day and told the daughter to give us an update in about a month  She called and reported that he was doing well on the higher dose so we decided to keep him on that  Over the next several months it became less clear whether his symptoms were improved on it twice a day  He did not think it was really helping although the daughter thought it was  Several weeks ago he decrease his omeprazole back down to once a day  He reports that has been no different however again the daughter thinks the throat clearing is worse now that he is on omeprazole once a day  The symptoms usually occur after eating  His daughter believes that he eats will wait too much hot sauce which is contributing to his symptoms  There is no dysphagia, odynophagia, early satiety  Denies any chest or abdominal pain  His bowels are normal   His weight is stable      Historical Information   Past Medical History:   Diagnosis Date    Anemia     [facility]     follows with Vandana Blue    Akers esophagus     Chronic anticoagulation     CKD (chronic kidney disease) stage 3, GFR 30-59 ml/min ([facility])     CVA (cerebral vascular accident) ([address] )     GERD (gastroesophageal reflux disease)     Hyperlipidemia     Hypertension     Hyperthyroidism     JIM on CPAP     JIM treated with BiPAP      Past Surgical History:   Procedure Laterality Date    AV NODE ABLATION      for atrial fibrillation    CARPAL TUNNEL RELEASE Left     CATARACT EXTRACTION W/  INTRAOCULAR LENS IMPLANT Bilateral     COLONOSCOPY      EGD  02/16/2017    ROTATOR CUFF REPAIR Bilateral     TONSILLECTOMY      UPPER GASTROINTESTINAL ENDOSCOPY  02/16/2017    eerosive gastritis, irreg z line-(+) Akers's w/out dysplasia, (-) HP Social History     Substance and Sexual Activity   Alcohol Use Yes    Comment: rare social     Social History     Substance and Sexual Activity   Drug Use Never     Social History     Tobacco Use   Smoking Status Former Smoker    Types: Cigarettes   Smokeless Tobacco Never Used     Family History   Problem Relation Age of Onset    No Known Problems Mother     No Known Problems Father     Colon cancer Neg Hx     Colon polyps Neg Hx          Current Outpatient Medications:     amLODIPine (NORVASC) 2 5 mg tablet    amLODIPine-atorvastatin (CADUET) 10-10 MG per tablet    atorvastatin (LIPITOR) 10 mg tablet    furosemide (LASIX) 20 mg tablet    lisinopril (ZESTRIL) 20 mg tablet    metoprolol tartrate (LOPRESSOR) 50 mg tablet    Multiple Vitamin (MULTIVITAMIN) tablet    omeprazole (PriLOSEC) 40 MG capsule    triamcinolone (KENALOG) 0 5 % cream    warfarin (COUMADIN) 3 mg tablet    aspirin (ECOTRIN LOW STRENGTH) 81 mg EC tablet  Allergies   Allergen Reactions    Diclofenac Rash     Full body rash     Reviewed medications and allergies and updated as indicated    PHYSICAL EXAM:    Blood pressure 124/70, pulse 65, height 5' 5" (1 651 m), weight 73 9 kg (163 lb)  Body mass index is 27 12 kg/m²  General Appearance: NAD, cooperative, alert  Eyes: Anicteric, PERRLA, EOMI  ENT:  Normocephalic, atraumatic, normal mucosa  Neck:  Supple, symmetrical, trachea midline  Resp:  Clear to auscultation bilaterally; no rales, rhonchi or wheezing; respirations unlabored   CV:  S1 S2, Regular rate and rhythm; no murmur, rub, or gallop  GI:  Soft, non-tender, non-distended; normal bowel sounds; no masses, no organomegaly   Rectal: Deferred  Musculoskeletal: No cyanosis, clubbing or edema  Normal ROM    Skin:  No jaundice, rashes, or lesions   Heme/Lymph: No palpable cervical lymphadenopathy  Psych: Normal affect, good eye contact  Neuro: No gross deficits, AAOx3    Lab Results:   Hemoglobin 11 3, hematocrit 36 2, white count 5 9, platelet count 631   Sodium 145, potassium 5 4, chloride 111, bicarb 24, BUN 27, creatinine 1 25, glucose 98   LFTs normal  (7/6/22)    Radiology Results:   No results found

## 2022-12-19 ENCOUNTER — OFFICE VISIT (OUTPATIENT)
Dept: GASTROENTEROLOGY | Facility: CLINIC | Age: 87
End: 2022-12-19

## 2022-12-19 VITALS
DIASTOLIC BLOOD PRESSURE: 78 MMHG | HEIGHT: 65 IN | WEIGHT: 168.4 LBS | BODY MASS INDEX: 28.06 KG/M2 | SYSTOLIC BLOOD PRESSURE: 112 MMHG

## 2022-12-19 DIAGNOSIS — Z12.11 COLON CANCER SCREENING: ICD-10-CM

## 2022-12-19 DIAGNOSIS — K22.70 BARRETT'S ESOPHAGUS WITHOUT DYSPLASIA: ICD-10-CM

## 2022-12-19 DIAGNOSIS — K21.9 GASTROESOPHAGEAL REFLUX DISEASE WITHOUT ESOPHAGITIS: Primary | ICD-10-CM

## 2022-12-19 RX ORDER — LISINOPRIL 10 MG/1
10 TABLET ORAL 2 TIMES DAILY
COMMUNITY
Start: 2022-09-15

## 2022-12-19 NOTE — PROGRESS NOTES
[id number] - Outpatient Follow-up Note  Ashley Rodas 81 y o  male MRN: [id number]  Encounter: [id number]    ASSESSMENT AND PLAN:      1  Gastroesophageal reflux disease without esophagitis  2  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  Noted with path operative  On omeprazole 20 mg daily  Atypical upper symptoms which may or may not be related to reflux  No bowel symptoms  Not really making any major impact in his life  Patient and daughter would like to avoid any aggressive work-up  - Continue omeprazole 40 mg daily  - Follow symptoms for now    3  Colon cancer screening  Last years ago  No plans to repeat because of age      Followup Appointment: 1 year  ______________________________________________________________________    Chief Complaint   Patient presents with   • Medication Management     HPI: The patient is seen in the office today  He has a known history of GERD and Akers's esophagus and has been evaluated for possible atypical reflux symptoms  He has been maintained on omeprazole 40 mg daily  He reports issues with chronic cough and some clearing of his throat  We increased his omeprazole to twice a day which really did not make much of a difference  He tried to back off the hospitalist which his work-up was a contributing factor and this also did not make a difference  He denies any dysphagia, odynophagia, or early satiety  He denies any chest or abdominal pain  Moving regularly  His weight is stable    He denies any GI    Historical Information   Past Medical History:   Diagnosis Date   • Anemia    • Atrial fibrillation and flutter ([facility] )     follows with Edson Clayton   • Akers esophagus    • Chronic anticoagulation    • CKD (chronic kidney disease) stage 3, GFR 30-59 ml/min ([facility])    • [facility] )    • GERD (gastroesophageal reflux disease)    • Hyperlipidemia    • Hypertension    • Hyperthyroidism    • JIM on CPAP    • JIM treated with BiPAP      Past Surgical History:   Procedure Laterality Date   • AV NODE ABLATION      for atrial fibrillation   • CARPAL TUNNEL RELEASE Left    • CATARACT EXTRACTION W/  INTRAOCULAR LENS IMPLANT Bilateral    • COLONOSCOPY     • EGD  02/16/2017   • ROTATOR CUFF REPAIR Bilateral    • TONSILLECTOMY     • UPPER GASTROINTESTINAL ENDOSCOPY  02/16/2017    eerosive gastritis, irreg z line-(+) Akers's w/out dysplasia, (-) HP     Social History     Substance and Sexual Activity   Alcohol Use Yes    Comment: rare social     Social History     Substance and Sexual Activity   Drug Use Never     Social History     Tobacco Use   Smoking Status Former   • Types: Cigarettes   Smokeless Tobacco Never     Family History   Problem Relation Age of Onset   • No Known Problems Mother    • No Known Problems Father    • Colon cancer Neg Hx    • Colon polyps Neg Hx          Current Outpatient Medications:   •  atorvastatin (LIPITOR) 10 mg tablet  •  furosemide (LASIX) 20 mg tablet  •  lisinopril (ZESTRIL) 10 mg tablet  •  Multiple Vitamin (MULTIVITAMIN) tablet  •  omeprazole (PriLOSEC) 40 MG capsule  •  warfarin (COUMADIN) 3 mg tablet  •  amLODIPine (NORVASC) 2 5 mg tablet  •  amLODIPine-atorvastatin (CADUET) 10-10 MG per tablet  •  aspirin (ECOTRIN LOW STRENGTH) 81 mg EC tablet  •  metoprolol tartrate (LOPRESSOR) 50 mg tablet  •  triamcinolone (KENALOG) 0 5 % cream  Allergies   Allergen Reactions   • Diclofenac Rash     Full body rash     Reviewed medications and allergies and updated as indicated    PHYSICAL EXAM:    Blood pressure 112/78, height 5' 5" (1 651 m), weight 76 4 kg (168 lb 6 4 oz)  Body mass index is 28 02 kg/m²  General Appearance: NAD, cooperative, alert  Eyes: Anicteric, PERRLA, EOMI  ENT:  Normocephalic, atraumatic, normal mucosa      Neck:  Supple, symmetrical, trachea midline  Resp:  Clear to auscultation bilaterally; no rales, rhonchi or wheezing; respirations unlabored   CV:  S1 S2, Regular rate and rhythm; no murmur, rub, or gallop  GI:  Soft, non-tender, non-distended; normal bowel sounds; no masses, no organomegaly   Rectal: Deferred  Musculoskeletal: No cyanosis, clubbing or edema  Normal ROM  Skin:  No jaundice, rashes, or lesions   Heme/Lymph: No palpable cervical lymphadenopathy  Psych: Normal affect, good eye contact  Neuro: No gross deficits, AAOx3    Lab Results:   None recently    Radiology Results:   No results found

## 2022-12-19 NOTE — PATIENT INSTRUCTIONS
Continue Prilosec 40 mg once a day  Follow symptoms    Follow-up office visit 1 year but if having increasing issues let me know

## 2022-12-19 NOTE — LETTER
December 19, 2022     MD Steve Stauffer  [address] [place] [address]    Patient: Lizandro Long   YOB: 1935   Date of Visit: 12/19/2022       Dear Dr Esha Barone: Thank you for referring Lizandro Long to me for evaluation  Below are my notes for this consultation  If you have questions, please do not hesitate to call me  I look forward to following your patient along with you  Sincerely,        Pao Johns MD        CC: MD Pao Caceres MD  12/19/2022  5:15 PM  Sign when Signing Visit  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  Noted with path operative  On omeprazole 20 mg daily  Atypical upper symptoms which may or may not be related to reflux  No bowel symptoms  Not really making any major impact in his life  Patient and daughter would like to avoid any aggressive work-up  - Continue omeprazole 40 mg daily  - Follow symptoms for now    3  Colon cancer screening  Last years ago  No plans to repeat because of age      Followup Appointment: 1 year  ______________________________________________________________________    Chief Complaint   Patient presents with   • Medication Management     HPI: The patient is seen in the office today  He has a known history of GERD and Akers's esophagus and has been evaluated for possible atypical reflux symptoms  He has been maintained on omeprazole 40 mg daily  He reports issues with chronic cough and some clearing of his throat  We increased his omeprazole to twice a day which really did not make much of a difference  He tried to back off the hospitalist which his work-up was a contributing factor and this also did not make a difference    He denies any dysphagia, odynophagia, or early satiety  He denies any chest or abdominal pain  Moving regularly  His weight is stable    He denies any GI    Historical Information   Past Medical History:   Diagnosis Date   • Anemia    • Atrial fibrillation and flutter ([facility] [address] )     follows with Simeon Archer   • Akers esophagus    • Chronic anticoagulation    • CKD (chronic kidney disease) stage 3, GFR 30-59 ml/min ([facility])    • CVA (cerebral vascular accident) ([facility] [address] )    • GERD (gastroesophageal reflux disease)    • Hyperlipidemia    • Hypertension    • Hyperthyroidism    • JIM on CPAP    • JIM treated with BiPAP      Past Surgical History:   Procedure Laterality Date   • AV NODE ABLATION      for atrial fibrillation   • CARPAL TUNNEL RELEASE Left    • CATARACT EXTRACTION W/  INTRAOCULAR LENS IMPLANT Bilateral    • COLONOSCOPY     • EGD  02/16/2017   • ROTATOR CUFF REPAIR Bilateral    • TONSILLECTOMY     • UPPER GASTROINTESTINAL ENDOSCOPY  02/16/2017    eerosive gastritis, irreg z line-(+) Akers's w/out dysplasia, (-) HP     Social History     Substance and Sexual Activity   Alcohol Use Yes    Comment: rare social     Social History     Substance and Sexual Activity   Drug Use Never     Social History     Tobacco Use   Smoking Status Former   • Types: Cigarettes   Smokeless Tobacco Never     Family History   Problem Relation Age of Onset   • No Known Problems Mother    • No Known Problems Father    • Colon cancer Neg Hx    • Colon polyps Neg Hx          Current Outpatient Medications:   •  atorvastatin (LIPITOR) 10 mg tablet  •  furosemide (LASIX) 20 mg tablet  •  lisinopril (ZESTRIL) 10 mg tablet  •  Multiple Vitamin (MULTIVITAMIN) tablet  •  omeprazole (PriLOSEC) 40 MG capsule  •  warfarin (COUMADIN) 3 mg tablet  •  amLODIPine (NORVASC) 2 5 mg tablet  •  amLODIPine-atorvastatin (CADUET) 10-10 MG per tablet  •  aspirin (ECOTRIN LOW STRENGTH) 81 mg EC tablet  •  metoprolol tartrate (LOPRESSOR) 50 mg tablet  •  triamcinolone (KENALOG) 0 5 % cream  Allergies   Allergen Reactions   • Diclofenac Rash     Full body rash     Reviewed medications and allergies and updated as indicated    PHYSICAL EXAM:    Blood pressure 112/78, height 5' 5" (1 651 m), weight 76 4 kg (168 lb 6 4 oz)  Body mass index is 28 02 kg/m²  General Appearance: NAD, cooperative, alert  Eyes: Anicteric, PERRLA, EOMI  ENT:  Normocephalic, atraumatic, normal mucosa  Neck:  Supple, symmetrical, trachea midline  Resp:  Clear to auscultation bilaterally; no rales, rhonchi or wheezing; respirations unlabored   CV:  S1 S2, Regular rate and rhythm; no murmur, rub, or gallop  GI:  Soft, non-tender, non-distended; normal bowel sounds; no masses, no organomegaly   Rectal: Deferred  Musculoskeletal: No cyanosis, clubbing or edema  Normal ROM  Skin:  No jaundice, rashes, or lesions   Heme/Lymph: No palpable cervical lymphadenopathy  Psych: Normal affect, good eye contact  Neuro: No gross deficits, AAOx3    Lab Results:   None recently    Radiology Results:   No results found

## 2023-10-17 DIAGNOSIS — K21.9 GASTROESOPHAGEAL REFLUX DISEASE WITHOUT ESOPHAGITIS: ICD-10-CM

## 2023-10-17 DIAGNOSIS — K22.70 BARRETT'S ESOPHAGUS WITHOUT DYSPLASIA: ICD-10-CM

## 2023-10-17 RX ORDER — OMEPRAZOLE 40 MG/1
40 CAPSULE, DELAYED RELEASE ORAL EVERY MORNING
Qty: 90 CAPSULE | Refills: 0 | Status: SHIPPED | OUTPATIENT
Start: 2023-10-17

## 2024-02-21 PROBLEM — R09.89 THROAT CLEARING: Status: RESOLVED | Noted: 2019-05-30 | Resolved: 2024-02-21

## 2024-02-21 PROBLEM — Z12.11 COLON CANCER SCREENING: Status: RESOLVED | Noted: 2019-05-30 | Resolved: 2024-02-21

## 2024-02-21 PROBLEM — R05.9 COUGH: Status: RESOLVED | Noted: 2019-09-09 | Resolved: 2024-02-21

## 2024-04-15 DIAGNOSIS — K22.70 BARRETT'S ESOPHAGUS WITHOUT DYSPLASIA: ICD-10-CM

## 2024-04-15 DIAGNOSIS — K21.9 GASTROESOPHAGEAL REFLUX DISEASE WITHOUT ESOPHAGITIS: ICD-10-CM

## 2024-04-16 RX ORDER — OMEPRAZOLE 40 MG/1
40 CAPSULE, DELAYED RELEASE ORAL EVERY MORNING
Qty: 90 CAPSULE | Refills: 0 | Status: SHIPPED | OUTPATIENT
Start: 2024-04-16

## 2024-04-18 NOTE — TELEPHONE ENCOUNTER
Patient called requesting refill for omeprazole. Patient made aware medication was refilled on 4/16/24 for 90 with 0 refills to [place] [facility]. Patient instructed to contact the pharmacy to obtain refills of medication. Patient verbalized understanding.

## 2024-04-18 NOTE — TELEPHONE ENCOUNTER
Patient called Rx refill line stating someone left a message for him to call office. Please contact patient.

## 2024-07-12 DIAGNOSIS — K22.70 BARRETT'S ESOPHAGUS WITHOUT DYSPLASIA: ICD-10-CM

## 2024-07-12 DIAGNOSIS — K21.9 GASTROESOPHAGEAL REFLUX DISEASE WITHOUT ESOPHAGITIS: ICD-10-CM

## 2024-07-12 RX ORDER — OMEPRAZOLE 40 MG/1
40 CAPSULE, DELAYED RELEASE ORAL EVERY MORNING
Qty: 30 CAPSULE | Refills: 0 | Status: SHIPPED | OUTPATIENT
Start: 2024-07-12 | End: 2024-07-15 | Stop reason: SDUPTHER

## 2024-07-12 RX ORDER — OMEPRAZOLE 40 MG/1
40 CAPSULE, DELAYED RELEASE ORAL EVERY MORNING
Qty: 100 CAPSULE | Refills: 0 | OUTPATIENT
Start: 2024-07-12

## 2024-07-12 NOTE — TELEPHONE ENCOUNTER
Reason for call:   [x] Refill   [] Prior Auth  [] Other:     Office:   [] PCP/Provider -   [x] Specialty/Provider - gastro/Licha Raman PA-C     Medication: omeprazole (PriLOSEC) 40 MG capsule     Dose/Frequency:        TAKE ONE CAPSULE BY MOUTH EVERY MORNING                      Quantity: 100    Pharmacy: [facility] [place] [facility] [place] [address] [phone]     Does the patient have enough for 3 days?   [x] Yes   [] No - Send as HP to POD

## 2024-07-15 ENCOUNTER — OFFICE VISIT (OUTPATIENT)
Dept: GASTROENTEROLOGY | Facility: CLINIC | Age: 89
End: 2024-07-15
Payer: COMMERCIAL

## 2024-07-15 VITALS
BODY MASS INDEX: 26.65 KG/M2 | HEIGHT: 66 IN | DIASTOLIC BLOOD PRESSURE: 69 MMHG | WEIGHT: 165.8 LBS | SYSTOLIC BLOOD PRESSURE: 153 MMHG

## 2024-07-15 DIAGNOSIS — Z12.11 COLON CANCER SCREENING: ICD-10-CM

## 2024-07-15 DIAGNOSIS — R09.82 POST-NASAL DRIP: Primary | ICD-10-CM

## 2024-07-15 DIAGNOSIS — K21.9 GASTROESOPHAGEAL REFLUX DISEASE WITHOUT ESOPHAGITIS: ICD-10-CM

## 2024-07-15 DIAGNOSIS — K22.70 BARRETT'S ESOPHAGUS WITHOUT DYSPLASIA: ICD-10-CM

## 2024-07-15 PROCEDURE — 99214 OFFICE O/P EST MOD 30 MIN: CPT | Performed by: INTERNAL MEDICINE

## 2024-07-15 RX ORDER — OMEPRAZOLE 40 MG/1
40 CAPSULE, DELAYED RELEASE ORAL EVERY MORNING
Qty: 90 CAPSULE | Refills: 5 | Status: SHIPPED | OUTPATIENT
Start: 2024-07-15

## 2024-07-15 NOTE — PROGRESS NOTES
[facility] - Outpatient Follow-up Note  Willy Bhatia 88 y.o. male MRN: [id number]  Encounter: [id number]    ASSESSMENT AND PLAN:      1. Post-nasal drip  2. Gastroesophageal reflux disease without esophagitis  Patient with atypical upper esophageal and ENT symptoms.  No improvement with omeprazole 40 mg twice a day.  Daughter has issues with postnasal drip and thinks his symptoms are similar.  Do not want to put father through scope currently  -Continue omeprazole (PriLOSEC) 40 MG capsule; Take 1 capsule (40 mg total) by mouth every morning  Dispense: 90 capsule; Refill: 5  -ENT evaluation  -If ENT evaluation is negative and patient with persistent symptoms can consider repeating EGD    3. 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  Last EGD 2019.  No plans to repeat  - omeprazole (PriLOSEC) 40 MG capsule; Take 1 capsule (40 mg total) by mouth every morning  Dispense: 90 capsule; Refill: 5    4. Colon cancer screening  No plans to repeat colonoscopy secondary to age      Follow up appointment: 1 year    _______________________      Chief Complaint   Patient presents with    Feels like there is a lump in his throat       HPI:   Patient is a 88 y.o. male with a significant PMH of GERD presenting for follow up regarding ongoing symptoms.  Patient reports persistent clearing his throat as well as a globus sensation.  He denies any heartburn or indigestion.  He denies any dysphagia, odynophagia, early satiety.  He is moving his bowels regularly.  His weight is stable.  He continues take omeprazole 40 mg daily.  We increased his omeprazole to twice a day in the past that made no difference in the symptoms.  His daughter who accompanies him says multiple family members have the same symptoms related to postnasal drip and of done met better on sinus medications.  Patient has never had an ENT evaluation    Historical Information   Past Medical History:   Diagnosis Date    Anemia     Atrial  "fibrillation and flutter (HCC)     follows with Brandon De Los Santos    Akers esophagus     Chronic anticoagulation     CKD (chronic kidney disease) stage 3, GFR 30-59 ml/min ([facility])     CVA (cerebral vascular accident) ([facility])     GERD (gastroesophageal reflux disease)     Hyperlipidemia     Hypertension     Hyperthyroidism     JIM on CPAP     JIM treated with BiPAP      Past Surgical History:   Procedure Laterality Date    AV NODE ABLATION      for atrial fibrillation    CARPAL TUNNEL RELEASE Left     CATARACT EXTRACTION W/  INTRAOCULAR LENS IMPLANT Bilateral     COLONOSCOPY      EGD  02/16/2017    ROTATOR CUFF REPAIR Bilateral     TONSILLECTOMY      UPPER GASTROINTESTINAL ENDOSCOPY  02/16/2017    eerosive gastritis, irreg z line-(+) Akers's w/out dysplasia, (-) HP     Social History     Substance and Sexual Activity   Alcohol Use Yes    Comment: rare social     Social History     Substance and Sexual Activity   Drug Use Never     Social History     Tobacco Use   Smoking Status Former    Types: Cigarettes   Smokeless Tobacco Never     Family History   Problem Relation Age of Onset    No Known Problems Mother     No Known Problems Father     Colon cancer Neg Hx     Colon polyps Neg Hx          Current Outpatient Medications:     atorvastatin (LIPITOR) 10 mg tablet    furosemide (LASIX) 20 mg tablet    lisinopril (ZESTRIL) 10 mg tablet    omeprazole (PriLOSEC) 40 MG capsule    warfarin (COUMADIN) 3 mg tablet  Allergies   Allergen Reactions    Diclofenac Rash     Full body rash     Reviewed medications and allergies and updated as indicated    PHYSICAL EXAM:    Blood pressure 153/69, height 5' 5.5\" (1.664 m), weight 75.2 kg (165 lb 12.8 oz). Body mass index is 27.17 kg/m².  General Appearance: NAD, cooperative, alert  Eyes: Anicteric  Chest: Clear to auscultation  Heart: Regular rate and rhythm  GI:  Soft, non-tender, non-distended; normal bowel sounds; no masses, no organomegaly   Rectal: Deferred  Musculoskeletal: No " edema.  Skin:  No jaundice    Lab Results:   None recently      Radiology Results:   No results found.

## 2024-07-15 NOTE — LETTER
July 15, 2024     Colin De La Cruz MD  [address] [place] [address]    Patient: Willy Bhatia   YOB: 1935   Date of Visit: 7/15/2024       Dear Dr. De La Cruz:    Thank you for referring Willy Bhatia to me for evaluation. Below are my notes for this consultation.    If you have questions, please do not hesitate to call me. I look forward to following your patient along with you.         Sincerely,        Colin Quesada MD        CC: MD Oneal Palma MD Michael J. Cassidy, MD  7/15/2024  4:25 PM  Sign when Signing Visit  [facility] - Outpatient Follow-up Note  Willy Bhatia 88 y.o. male MRN: [id number]  Encounter: [id number]    ASSESSMENT AND PLAN:      1. Post-nasal drip  2. Gastroesophageal reflux disease without esophagitis  Patient with atypical upper esophageal and ENT symptoms.  No improvement with omeprazole 40 mg twice a day.  Daughter has issues with postnasal drip and thinks his symptoms are similar.  Do not want to put father through scope currently  -Continue omeprazole (PriLOSEC) 40 MG capsule; Take 1 capsule (40 mg total) by mouth every morning  Dispense: 90 capsule; Refill: 5  -ENT evaluation  -If ENT evaluation is negative and patient with persistent symptoms can consider repeating EGD    3. 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  Last EGD 2019.  No plans to repeat  - omeprazole (PriLOSEC) 40 MG capsule; Take 1 capsule (40 mg total) by mouth every morning  Dispense: 90 capsule; Refill: 5    4. Colon cancer screening  No plans to repeat colonoscopy secondary to age      Follow up appointment: 1 year    _______________________      Chief Complaint   Patient presents with   • Feels like there is a lump in his throat       HPI:   Patient is a 88 y.o. male with a significant PMH of GERD presenting for follow up regarding ongoing symptoms.  Patient reports persistent clearing his throat as well as a globus sensation.   He denies any heartburn or indigestion.  He denies any dysphagia, odynophagia, early satiety.  He is moving his bowels regularly.  His weight is stable.  He continues take omeprazole 40 mg daily.  We increased his omeprazole to twice a day in the past that made no difference in the symptoms.  His daughter who accompanies him says multiple family members have the same symptoms related to postnasal drip and of done met better on sinus medications.  Patient has never had an ENT evaluation    Historical Information  Past Medical History:   Diagnosis Date   • Anemia    • Atrial fibrillation and flutter (HCC)     follows with Brandon De Los Santos   • Akers esophagus    • Chronic anticoagulation    • CKD (chronic kidney disease) stage 3, GFR 30-59 ml/min (HCC)    • CVA (cerebral vascular accident) (HCC)    • GERD (gastroesophageal reflux disease)    • Hyperlipidemia    • Hypertension    • Hyperthyroidism    • JIM on CPAP    • JIM treated with BiPAP      Past Surgical History:   Procedure Laterality Date   • AV NODE ABLATION      for atrial fibrillation   • CARPAL TUNNEL RELEASE Left    • CATARACT EXTRACTION W/  INTRAOCULAR LENS IMPLANT Bilateral    • COLONOSCOPY     • EGD  02/16/2017   • ROTATOR CUFF REPAIR Bilateral    • TONSILLECTOMY     • UPPER GASTROINTESTINAL ENDOSCOPY  02/16/2017    eerosive gastritis, irreg z line-(+) Akers's w/out dysplasia, (-) HP     Social History     Substance and Sexual Activity   Alcohol Use Yes    Comment: rare social     Social History     Substance and Sexual Activity   Drug Use Never     Social History     Tobacco Use   Smoking Status Former   • Types: Cigarettes   Smokeless Tobacco Never     Family History   Problem Relation Age of Onset   • No Known Problems Mother    • No Known Problems Father    • Colon cancer Neg Hx    • Colon polyps Neg Hx          Current Outpatient Medications:   •  atorvastatin (LIPITOR) 10 mg tablet  •  furosemide (LASIX) 20 mg tablet  •  lisinopril (ZESTRIL) 10 mg  "tablet  •  omeprazole (PriLOSEC) 40 MG capsule  •  warfarin (COUMADIN) 3 mg tablet  Allergies   Allergen Reactions   • Diclofenac Rash     Full body rash     Reviewed medications and allergies and updated as indicated    PHYSICAL EXAM:    Blood pressure 153/69, height 5' 5.5\" (1.664 m), weight 75.2 kg (165 lb 12.8 oz). Body mass index is 27.17 kg/m².  General Appearance: NAD, cooperative, alert  Eyes: Anicteric  Chest: Clear to auscultation  Heart: Regular rate and rhythm  GI:  Soft, non-tender, non-distended; normal bowel sounds; no masses, no organomegaly   Rectal: Deferred  Musculoskeletal: No edema.  Skin:  No jaundice    Lab Results:   None recently      Radiology Results:   No results found.  "

## 2024-07-15 NOTE — PATIENT INSTRUCTIONS
Continue omeprazole 40 mg daily.  Ear nose and throat evaluation.  Follow-up with me in 1 year but if ENT evaluation negative and interested in further GI workup come back sooner

## 2024-08-13 ENCOUNTER — TELEPHONE (OUTPATIENT)
Age: 89
End: 2024-08-13

## 2024-08-13 NOTE — TELEPHONE ENCOUNTER
Called and spoke with patient.Informed him that refills for his Omeprazole were sent to the pharmacy on 7/15/24.Patient verbalized understanding.He states he will call his pharmacy.

## 2024-08-13 NOTE — TELEPHONE ENCOUNTER
GI Physician: Dr. 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 [address] [facility] [address]  Phone: [phone]  Fax: [phone]  DENNIS #: --

## 2024-08-14 PROBLEM — Z12.11 COLON CANCER SCREENING: Status: RESOLVED | Noted: 2019-05-30 | Resolved: 2024-08-14

## 2025-07-29 ENCOUNTER — TELEPHONE (OUTPATIENT)
Dept: OTHER | Facility: HOSPITAL | Age: 89
End: 2025-07-29

## 2025-08-24 PROBLEM — I10 ESSENTIAL (PRIMARY) HYPERTENSION: Status: ACTIVE | Noted: 2025-08-24

## 2025-08-24 PROBLEM — Z86.73 HISTORY OF CVA (CEREBROVASCULAR ACCIDENT): Status: ACTIVE | Noted: 2025-08-24

## 2025-08-24 PROBLEM — H91.90 UNSPECIFIED HEARING LOSS, UNSPECIFIED EAR: Status: ACTIVE | Noted: 2025-08-24

## 2025-08-24 PROBLEM — M17.12 UNILATERAL PRIMARY OSTEOARTHRITIS, LEFT KNEE: Status: ACTIVE | Noted: 2025-08-24

## 2025-08-24 PROBLEM — E78.2 MIXED HYPERLIPIDEMIA: Status: ACTIVE | Noted: 2025-08-24

## 2025-08-24 PROBLEM — R73.02 IMPAIRED GLUCOSE TOLERANCE (ORAL): Status: ACTIVE | Noted: 2025-08-24

## 2025-08-24 PROBLEM — M17.11 UNILATERAL PRIMARY OSTEOARTHRITIS, RIGHT KNEE: Status: ACTIVE | Noted: 2025-08-24

## 2025-08-24 PROBLEM — I50.31 ACUTE DIASTOLIC (CONGESTIVE) HEART FAILURE (HCC): Status: ACTIVE | Noted: 2025-08-24

## 2025-08-24 PROBLEM — I70.209 UNSPECIFIED ATHEROSCLEROSIS OF NATIVE ARTERIES OF EXTREMITIES, UNSPECIFIED EXTREMITY (HCC): Status: ACTIVE | Noted: 2025-08-24

## 2025-08-24 PROBLEM — Z51.81 ENCOUNTER FOR THERAPEUTIC DRUG LEVEL MONITORING: Status: ACTIVE | Noted: 2025-08-24

## 2025-08-24 PROBLEM — E66.3 OVERWEIGHT: Status: ACTIVE | Noted: 2025-08-24

## 2025-08-24 PROBLEM — I49.8 OTHER SPECIFIED CARDIAC ARRHYTHMIAS: Status: ACTIVE | Noted: 2025-08-24

## 2025-08-26 PROBLEM — H35.3212 EXUDATIVE AGE-RELATED MACULAR DEGENERATION OF RIGHT EYE WITH INACTIVE CHOROIDAL NEOVASCULARIZATION (HCC): Status: ACTIVE | Noted: 2025-08-26

## 2025-08-26 PROBLEM — H35.3121 EARLY DRY STAGE NONEXUDATIVE AGE-RELATED MACULAR DEGENERATION OF LEFT EYE: Status: ACTIVE | Noted: 2025-08-26

## 2025-08-26 PROBLEM — I50.31 ACUTE DIASTOLIC (CONGESTIVE) HEART FAILURE (HCC): Status: RESOLVED | Noted: 2025-08-24 | Resolved: 2025-08-26
